# Patient Record
Sex: FEMALE | Race: WHITE | Employment: OTHER | URBAN - METROPOLITAN AREA
[De-identification: names, ages, dates, MRNs, and addresses within clinical notes are randomized per-mention and may not be internally consistent; named-entity substitution may affect disease eponyms.]

---

## 2022-09-14 ENCOUNTER — OFFICE VISIT (OUTPATIENT)
Dept: NEUROSURGERY | Age: 77
End: 2022-09-14
Payer: MEDICARE

## 2022-09-14 VITALS
HEIGHT: 66 IN | TEMPERATURE: 97.3 F | DIASTOLIC BLOOD PRESSURE: 78 MMHG | OXYGEN SATURATION: 99 % | BODY MASS INDEX: 23.46 KG/M2 | HEART RATE: 70 BPM | SYSTOLIC BLOOD PRESSURE: 159 MMHG | WEIGHT: 146 LBS

## 2022-09-14 DIAGNOSIS — S12.040S: ICD-10-CM

## 2022-09-14 PROBLEM — S12.000A C1 CERVICAL FRACTURE (HCC): Status: ACTIVE | Noted: 2022-09-14

## 2022-09-14 PROCEDURE — G8400 PT W/DXA NO RESULTS DOC: HCPCS | Performed by: NURSE PRACTITIONER

## 2022-09-14 PROCEDURE — 4004F PT TOBACCO SCREEN RCVD TLK: CPT | Performed by: NURSE PRACTITIONER

## 2022-09-14 PROCEDURE — G8427 DOCREV CUR MEDS BY ELIG CLIN: HCPCS | Performed by: NURSE PRACTITIONER

## 2022-09-14 PROCEDURE — G8420 CALC BMI NORM PARAMETERS: HCPCS | Performed by: NURSE PRACTITIONER

## 2022-09-14 PROCEDURE — 1123F ACP DISCUSS/DSCN MKR DOCD: CPT | Performed by: NURSE PRACTITIONER

## 2022-09-14 PROCEDURE — 99203 OFFICE O/P NEW LOW 30 MIN: CPT | Performed by: NURSE PRACTITIONER

## 2022-09-14 PROCEDURE — 1090F PRES/ABSN URINE INCON ASSESS: CPT | Performed by: NURSE PRACTITIONER

## 2022-09-14 RX ORDER — TELMISARTAN AND HYDROCHLORTHIAZIDE 80; 12.5 MG/1; MG/1
TABLET ORAL
COMMUNITY
Start: 2022-07-11

## 2022-09-14 RX ORDER — LORATADINE 10 MG/1
10 TABLET ORAL DAILY PRN
COMMUNITY

## 2022-09-14 RX ORDER — TRAMADOL HYDROCHLORIDE 50 MG/1
100 TABLET ORAL EVERY 8 HOURS PRN
COMMUNITY
Start: 2021-09-07 | End: 2023-09-07

## 2022-09-14 RX ORDER — ESTRADIOL 0.5 MG/1
TABLET ORAL
COMMUNITY
Start: 2022-03-23

## 2022-09-14 RX ORDER — LEVOTHYROXINE SODIUM 0.07 MG/1
100 TABLET ORAL EVERY MORNING
COMMUNITY

## 2022-09-14 ASSESSMENT — PATIENT HEALTH QUESTIONNAIRE - PHQ9
2. FEELING DOWN, DEPRESSED OR HOPELESS: 0
SUM OF ALL RESPONSES TO PHQ QUESTIONS 1-9: 0
SUM OF ALL RESPONSES TO PHQ9 QUESTIONS 1 & 2: 0
SUM OF ALL RESPONSES TO PHQ QUESTIONS 1-9: 0
1. LITTLE INTEREST OR PLEASURE IN DOING THINGS: 0

## 2022-09-14 NOTE — PROGRESS NOTES
[facility] NOTE:   History of Present Illness:    CC: C1 fracture    Sarah May is a 68 y.o. female here for a second opinion concerning a C1 fracture. Patient fell back in May and was seen in the ER in [facility]. At that time the patient was diagnosed with a comminuted, displaced fracture of C1 with lateral subluxation of the right lateral mass of C1 in relation ship of C2. Patient was placed in an [place] collar and has been followed by Dr. Tej Beach for several months. Patient has yet to show signs of bony healing. Patient was encouraged to seek a second opinion. At this time patient denies any numbness tingling difficulty walking or dropping things regularly.     Past Medical History:   Diagnosis Date    Hypertension     Hypothyroid       Past Surgical History:   Procedure Laterality Date    CERVICAL DISCECTOMY      HYSTERECTOMY (CERVIX STATUS UNKNOWN)      LUMBAR FUSION      x2     Allergies   Allergen Reactions    Celebrex [Celecoxib] Other (See Comments)     Bleeds    Lisinopril Other (See Comments)     choking      Family History   Problem Relation Age of Onset    Breast Cancer Mother     Heart Disease Mother       Social History     Socioeconomic History    Marital status:      Spouse name: Not on file    Number of children: Not on file    Years of education: Not on file    Highest education level: Not on file   Occupational History    Not on file   Tobacco Use    Smoking status: Never    Smokeless tobacco: Never   Vaping Use    Vaping Use: Never used   Substance and Sexual Activity    Alcohol use: Yes    Drug use: Not on file    Sexual activity: Not on file   Other Topics Concern    Not on file   Social History Narrative    Not on file     Social Determinants of Health     Financial Resource Strain: Not on file   Food Insecurity: Not on file   Transportation Needs: Not on file   Physical Activity: Not on file   Stress: Not on file   Social Connections: Not on file   Intimate Partner Violence: Not on file   Housing Stability: Not on file     Current Outpatient Medications   Medication Sig Dispense Refill    ASA-APAP-CAFF-CA GLUC PO Take by mouth      Multiple Vitamin (MULTIVITAMIN ADULT PO) Take 1 tablet by mouth daily      Aspirin-Caffeine 500-32.5 MG TABS Take 2 tablets by mouth every 8 hours as needed      cyanocobalamin 1000 MCG tablet Take 1,000 mcg by mouth daily      estradiol (ESTRACE) 0.5 MG tablet estradiol 0.5 mg tablet      levothyroxine (SYNTHROID) 75 MCG tablet Take 100 mcg by mouth every morning      loratadine (CLARITIN) 10 MG tablet Take 10 mg by mouth daily as needed      telmisartan-hydroCHLOROthiazide (MICARDIS HCT) 80-12.5 MG per tablet TAKE 1 TABLET BY MOUTH EVERY DAY      traMADol (ULTRAM) 50 MG tablet Take 100 mg by mouth every 8 hours as needed. No current facility-administered medications for this visit. There is no problem list on file for this patient. ROS Review of Systems    Constitutional:                    No recent weight changes, fever, fatigue, sleep difficulties, loss of appetite   ENT/Mouth:  No hearing loss, No ringing in the ears, chronic sinus problem, nose bleeds,sore throat, voice change, hoarseness, swollen glands in neck, or difficulties with chewing and swallowing. Cardiovascular:  No chest pain/angina pectoris, palpitations, swelling of feet/ankles/hands, or calf pain while walking. Respiratory: No chronic or frequent coughs, spitting up blood, shortness of breath, No asthma, or wheezing.      Gastrointestinal: No a bdominal pain, heartburn, nausea, vomiting, constipation, or frequent diarrhea     Genitourinary: No frequent urination, burning or painful urination, or blood in urine     Musculoskeletal:   POS neck pain     Integument:   No rash/itching     Neurological:   Dizziness/vertigo, No numbness/tingling sensation, tremors, No weakness in limbs, frequent or recurring headaches, memory loss or confusion. Physical Exam:    General: No acute distress  Head normocephalic and atraumatic  Mood and affect appropriate  CV: Regular rate   Resp: No increased work of breathing  Skin: warm and dry   Awake, alert, and oriented   Speech fluent  Eyes open spontaneously   Face symmetric and tongue midline on protrusion  Sternocleidomastoid and trapezius 5/5  No mid-line cervical, thoracic, or lumbar tenderness to palpation   Patient with strength exam as follows:   Upper Extremities: Right Left      Deltoid  5 5    Biceps  5 5    Triceps  5 5      5 5   Hand Intrinsics  5 5  Wrist flexors/extensors  5 5     Lower Extremities:      Hip Flex 5 5    Quads  5 5    Hamstrings 5 5    Dorsiflex 5 5    Plantarflex 5 5    EHL  5 5  Sensation intact to light touch and pin-prick   DTR 2+  No clonus or babinski present   No Maya's sign present bilaterally   Gait normal    Assessment & Plan:  Jeronimo Restrepo is a 68 y.o. female who presents for a second opinion concerning her C1 fracture. Because the patient has had this fracture for several months and has shown no signs of healing Dr. Katya Camarillo wished for her to seek a second opinion. After discussing with Dr. Columba Serra a referral is being sent to Dr. Luciana Hemphill for the patient to be evaluated for a possible surgical fusion of C1-2. Patient is to stay in her [place] collar until she is evaluated. No diagnosis found. Notes are transcribed with [employer], a medical voice recording dictation service, and may contain minor errors.     Esther Da Silva, NP  [id number]

## 2022-09-16 ENCOUNTER — TELEPHONE (OUTPATIENT)
Dept: NEUROSURGERY | Age: 77
End: 2022-09-16

## 2022-09-16 DIAGNOSIS — S12.040S: Primary | ICD-10-CM

## 2022-09-16 NOTE — TELEPHONE ENCOUNTER
Beulah Polo with [facility] - orthopedic surgery (Dr. Génesis Dhillon new office) states that Dr. Corey Borjas doesn't join their office until 11/28/22. She wants to know if patient can wait until then to see Dr. Corey Borjas being that she is being referred for a C1 fx? Please advise.

## 2022-09-20 RX ORDER — PREGABALIN 50 MG/1
50 CAPSULE ORAL 2 TIMES DAILY
COMMUNITY

## 2022-09-27 NOTE — TELEPHONE ENCOUNTER
Patient referred to Dr. Omid Gomez. Fax confirmation received. Patient is aware and thanked me for calling. Patient will reach out to Dr. Bryson Caceres office for scheduling.